# Patient Record
Sex: FEMALE | Race: WHITE | Employment: STUDENT | URBAN - METROPOLITAN AREA
[De-identification: names, ages, dates, MRNs, and addresses within clinical notes are randomized per-mention and may not be internally consistent; named-entity substitution may affect disease eponyms.]

---

## 2017-03-30 ENCOUNTER — TELEPHONE (OUTPATIENT)
Dept: ENDOCRINOLOGY | Age: 27
End: 2017-03-30

## 2017-03-30 DIAGNOSIS — E04.1 THYROID NODULE: ICD-10-CM

## 2017-03-30 DIAGNOSIS — R13.10 DYSPHAGIA, UNSPECIFIED TYPE: Primary | ICD-10-CM

## 2017-03-30 DIAGNOSIS — E03.9 HYPOTHYROIDISM, ADULT: ICD-10-CM

## 2017-03-30 NOTE — TELEPHONE ENCOUNTER
Addendum: 3/30/2017, 3:44 PM  Spoke with Carole Hughes by phone. Patient states that she has noticed some difficulty in swallowing. Wonders if her thyroid may be swollen. Patient states that she has been missing a dose of Levothyroxine \"here and there\", maybe once a week. Advised patient that per Dr. Isidra Art, he will order a thyroid ultrasound,a  swallowing study and labwork. Patient states will have labs drawn tomorrow.      Mj Barrera LPN

## 2017-03-30 NOTE — TELEPHONE ENCOUNTER
Patient is calling in regards to difficulty swollowing. She is scheduled  for 08/2017. Patient contact:  [phone]. Thank you.   Mike Shaikh

## 2017-04-01 LAB
FT4I SERPL CALC-MCNC: 2.7 (ref 1.2–4.9)
T3RU NFR SERPL: 36 % (ref 24–39)
T4 SERPL-MCNC: 7.6 UG/DL (ref 4.5–12)
TSH SERPL DL<=0.005 MIU/L-ACNC: 1.21 UIU/ML (ref 0.45–4.5)

## 2017-04-03 NOTE — PROGRESS NOTES
Her thyroid levels are good. Continue the Levothyroxine 75mcg daily. Once the imaging tests come back we will let her know what they show.

## 2017-04-11 ENCOUNTER — HOSPITAL ENCOUNTER (OUTPATIENT)
Dept: GENERAL RADIOLOGY | Age: 27
Discharge: HOME | End: 2017-04-11
Attending: INTERNAL MEDICINE
Payer: COMMERCIAL

## 2017-04-11 ENCOUNTER — HOSPITAL ENCOUNTER (OUTPATIENT)
Dept: ULTRASOUND IMAGING | Age: 27
Discharge: HOME | End: 2017-04-11
Attending: INTERNAL MEDICINE
Payer: COMMERCIAL

## 2017-04-11 DIAGNOSIS — R13.10 DYSPHAGIA, UNSPECIFIED TYPE: ICD-10-CM

## 2017-04-11 DIAGNOSIS — E04.1 THYROID NODULE: ICD-10-CM

## 2017-04-11 PROCEDURE — 92611 MOTION FLUOROSCOPY/SWALLOW: CPT

## 2017-04-11 PROCEDURE — 74230 X-RAY XM SWLNG FUNCJ C+: CPT

## 2017-04-11 PROCEDURE — 76536 US EXAM OF HEAD AND NECK: CPT

## 2017-04-11 NOTE — PROGRESS NOTES
[address] [place] [address]    Speech Pathology Modified barium swallow Study  Patient: Jun Luna (54 y.o. female)  Date: 4/11/2017  Referring Provider: Dr. Patricia Martínez:   Patient is a 27year old female with past medical history significant for non-malignant thyroid nodule and hypothyroidism that is controlled with medication. Patient complains of food and liquid \"not going down as it should\" when she swallows while head is turned to the left, and a feeling of swelling in her throat. She reports she noticed the issue a couple of months ago and it occurs about once a week. Patient does not notice the issue happening with any specific foods and she has not found anything that helps to alleviate the problem. Patient denies any coughing, choking, trouble swallowing pills, reflux, respiratory issues, or neurological issues. Patient reports she was worried the issue might be caused by her thyroid nodule. Patient was referred by her endocrinologist.     Cristin Spring:   Past Medical History:   Past Medical History:   Diagnosis Date    ADD (attention deficit disorder)     Thyroid nodule     left     Past Surgical History:   Procedure Laterality Date    HX WISDOM TEETH EXTRACTION       Current Dietary Status: regular/thin liquid  Radiologist: Dr. Mannie Sifuentes Views: Lateral;Fluoro  Patient Position: standing    Trial 1:   Consistency Presented: Thin liquid;Pill/Tablet;Pudding; Solid   How Presented: Self-fed/presented;SLP-fed/presented;Cup/sip;Straw;Spoon; Successive swallows       Bolus Acceptance: No impairment   Bolus Formation/Control: No impairment:     Propulsion: No impairment   Oral Residue: None   Initiation of Swallow: No impairment   Timing: No impairment   Penetration: None   Aspiration/Timing: No evidence of aspiration   Pharyngeal Clearance: No residue                       Decreased Tongue Base Retraction?: No  Laryngeal Elevation: WFL (within functional limits)  Aspiration/Penetration Score: 1 (No penetration or aspiration-Contrast does not enter the airway)  Pharyngeal Symmetry: Not assessed  Pharyngeal-Esophageal Segment: No impairment  Pharyngeal Dysfunction: None    Oral Phase Severity: No impairment  Pharyngeal Phase Severity: N/A    ASSESSMENT :  Based on the objective data described above, the patient presents with intact oropharyngeal swallow. No penetration or aspiration noted on all trials. No oral or pharyngeal residue present. Barium tablet passed through pharynx with no difficulty. PLAN/RECOMMENDATIONS :  --regular/thin liquid diet  --do not swallow while head is turned to left     COMMUNICATION/EDUCATION:   The above findings and recommendations were discussed with: patient who verbalized understanding.     Thank you for this referral.  Matt Fournier  Time Calculation: 20 mins

## 2017-04-21 NOTE — PROGRESS NOTES
I have been trying to get in touch with Ms. Aleks Javier with no success. Her swallow study was normal, there was no evidence of an obstructions, masses or abnormalities in the swallowing mechanism. The Thyroid US was also unchanged with no new or concerning findings or features.

## 2017-05-22 RX ORDER — LEVOTHYROXINE SODIUM 75 UG/1
75 TABLET ORAL
Qty: 30 TAB | Refills: 5 | Status: SHIPPED | OUTPATIENT
Start: 2017-05-22 | End: 2017-09-05 | Stop reason: SDUPTHER

## 2017-05-22 NOTE — TELEPHONE ENCOUNTER
Notes Recorded by Latonya Johnson MD on 4/3/2017 at 8:11 AM  Her thyroid levels are good. Continue the Levothyroxine 75mcg daily. Once the imaging tests come back we will let her know what they show.

## 2017-09-05 ENCOUNTER — OFFICE VISIT (OUTPATIENT)
Dept: ENDOCRINOLOGY | Age: 27
End: 2017-09-05

## 2017-09-05 VITALS
BODY MASS INDEX: 20.96 KG/M2 | HEART RATE: 70 BPM | SYSTOLIC BLOOD PRESSURE: 117 MMHG | HEIGHT: 70 IN | DIASTOLIC BLOOD PRESSURE: 81 MMHG | WEIGHT: 146.4 LBS

## 2017-09-05 DIAGNOSIS — E03.9 HYPOTHYROIDISM, ADULT: Primary | ICD-10-CM

## 2017-09-05 DIAGNOSIS — E04.1 THYROID NODULE: ICD-10-CM

## 2017-09-05 RX ORDER — LEVOTHYROXINE SODIUM 75 UG/1
75 TABLET ORAL
Qty: 90 TAB | Refills: 1 | Status: SHIPPED | OUTPATIENT
Start: 2017-09-05 | End: 2017-09-06 | Stop reason: SDUPTHER

## 2017-09-05 NOTE — PATIENT INSTRUCTIONS
I'll call you with the results of your thyroid labs. If you become pregnant, call me an let me know, because we will need to adjust your thyroid hormone dose.

## 2017-09-05 NOTE — MR AVS SNAPSHOT
Visit Information Date & Time Provider Department Dept. Phone Encounter #  
 9/5/2017 11:30 AM Maritza Carias, [address] [facility] [phone] Follow-up Instructions Return in about 1 year (around 9/5/2018). Upcoming Health Maintenance Date Due  
 HPV AGE 9Y-34Y (1 of 3 - Female 3 Dose Series) 12/16/2003 DTaP/Tdap/Td series (1 - Tdap) 12/16/2013 PAP AKA CERVICAL CYTOLOGY 12/16/2013 INFLUENZA AGE 9 TO ADULT 8/1/2017 Allergies as of 9/5/2017  Review Complete On: 9/5/2017 By: Neville Schilling LPN No Known Allergies Current Immunizations  Never Reviewed No immunizations on file. Not reviewed this visit You Were Diagnosed With   
  
 Codes Comments Hypothyroidism, adult    -  Primary ICD-10-CM: E03.9 ICD-9-CM: 551. 9 Thyroid nodule     ICD-10-CM: E04.1 ICD-9-CM: 241.0 Vitals BP Pulse Height(growth percentile) Weight(growth percentile) BMI Smoking Status 117/81 70 5' 9.5\" (1.765 m) 146 lb 6.4 oz (66.4 kg) 21.31 kg/m2 Never Smoker Vitals History BMI and BSA Data Body Mass Index Body Surface Area  
 21.31 kg/m 2 1.8 m 2 Preferred Pharmacy Pharmacy Name Phone [id number] [place] [address] [phone] Your Updated Medication List  
  
   
This list is accurate as of: 9/5/17 11:56 AM.  Always use your most recent med list.  
  
  
  
  
 AMPHETAMINE SALT COMBO 15 mg tablet Generic drug:  dextroamphetamine-amphetamine Take 15 mg by mouth two (2) times a day. levothyroxine 75 mcg tablet Commonly known as:  SYNTHROID Take 1 Tab by mouth Daily (before breakfast). Indications: hypothyroidism We Performed the Following THYROID PANEL W/TSH [02069 CPT(R)] Follow-up Instructions Return in about 1 year (around 9/5/2018). Patient Instructions I'll call you with the results of your thyroid labs. If you become pregnant, call me an let me know, because we will need to adjust your thyroid hormone dose. Introducing [facility] SERVICES! Dora Lind introduces Pegg'd patient portal. Now you can access parts of your medical record, email your doctor's office, and request medication refills online. 1. In your internet browser, go to [email or web address] 2. Click on the First Time User? Click Here link in the Sign In box. You will see the New Member Sign Up page. 3. Enter your Pegg'd Access Code exactly as it appears below. You will not need to use this code after youve completed the sign-up process. If you do not sign up before the expiration date, you must request a new code. · Pegg'd Access Code: [email or web address] Expires: 12/4/2017 11:56 AM 
 
4. Enter the last four digits of your Social Security Number (xxxx) and Date of Birth (mm/dd/yyyy) as indicated and click Submit. You will be taken to the next sign-up page. 5. Create a Pegg'd ID. This will be your Pegg'd login ID and cannot be changed, so think of one that is secure and easy to remember. 6. Create a Pegg'd password. You can change your password at any time. 7. Enter your Password Reset Question and Answer. This can be used at a later time if you forget your password. 8. Enter your e-mail address. You will receive e-mail notification when new information is available in [address] 9. Click Sign Up. You can now view and download portions of your medical record. 10. Click the Download Summary menu link to download a portable copy of your medical information. If you have questions, please visit the Frequently Asked Questions section of the Pegg'd website. Remember, Pegg'd is NOT to be used for urgent needs. For medical emergencies, dial 911. Now available from your iPhone and Android! Please provide this summary of care documentation to your next provider. Your primary care clinician is listed as Vikram Ha. If you have any questions after today's visit, please call [phone].

## 2017-09-05 NOTE — PROGRESS NOTES
Chief Complaint   Patient presents with    Thyroid Problem     pcp and pharmacy verified     History of Present Illness: Amadeo Araujo is a 27 y.o. female here for follow up of hypothyroidism and thyroid nodule. She notes she was noted to have a \"bump on my thyroid\" by Dr. Sue Miller at [facility] and this lead to an [address] which confirmed a thyroid nodule. This was in 2014 and her pathology was benign. She had repeat thyroid US in July 2015 and the nodule was unchanged. Pt notes she had a hx of hyperthyroidism when she was in 7th grade. At our initial visit in July 2015 she was hypothyroid on 50mcg daily but was euthyroid when we increased her dose to 75mcg daily. Pt is still taking her LT4 75mcg daily. She notes that she will occasionally miss a dose, but nothing consistently. She denies issues of CP, SOB, palpitations, tremors, heat or cold intolerance, constipation or diarrhea. Her LMP was 8/15/17, she notes it is regular in timing and duration. She denies issues of dysphagia, dysphonia or chocking sensations. Current Outpatient Prescriptions   Medication Sig    levothyroxine (SYNTHROID) 75 mcg tablet Take 1 Tab by mouth Daily (before breakfast). Indications: hypothyroidism    AMPHETAMINE SALT COMBO 15 mg tablet Take 15 mg by mouth two (2) times a day. No current facility-administered medications for this visit. No Known Allergies  Review of Systems:  - Cardiovascular: no chest pain  - Neurological: no tremors  - Integumentary: skin is normal    Physical Examination:  Blood pressure 117/81, pulse 70, height 5' 9.5\" (1.765 m), weight 146 lb 6.4 oz (66.4 kg).   - General: pleasant, no distress, good eye contact   - Neck: no thyromegaly or thyroid bruits  - Cardiovascular: regular, normal rate, nl s1 and s2, no m/r/g   - Integumentary: skin is normal, no edema  - Neurological: reflexes 2+ at biceps, no tremors  - Psychiatric: normal mood and affect    Data Reviewed:   - none new for review    Assessment/Plan:   1)Hypothyroidism > Pt clinically euthyroid on LT4 75mcg daily. Willl check a TSH and FT4 today and adjust her LT4 dose as needed or refill her current dose. Pt instructed that if she finds out she is pregnant to increase her LT4 by two pills weekly and call me. 2) Thyroid nodule > Her Thyroid US was unchanged between July 2014 (she had a benign FNA at that time) and July 2017. No need to repeat the US further, unless she has any changes in her clinical picture. RTC 1 year     Pt voices understanding and agreement with the plan. Follow-up Disposition:  Return in about 1 year (around 9/5/2018).     Copy sent to:  Dr. Kae Brown

## 2017-09-05 NOTE — TELEPHONE ENCOUNTER
Notes Recorded by Etienne Grewal MD on 4/3/2017 at 8:11 AM  Her thyroid levels are good. Continue the Levothyroxine 75mcg daily. Once the imaging tests come back we will let her know what they show.            She did have the imaging studies. Was she to continue this dose? ([facility] day supply).

## 2017-09-06 ENCOUNTER — TELEPHONE (OUTPATIENT)
Dept: ENDOCRINOLOGY | Age: 27
End: 2017-09-06

## 2017-09-06 LAB
FT4I SERPL CALC-MCNC: 2 (ref 1.2–4.9)
T3RU NFR SERPL: 32 % (ref 24–39)
T4 SERPL-MCNC: 6.3 UG/DL (ref 4.5–12)
TSH SERPL DL<=0.005 MIU/L-ACNC: 0.76 UIU/ML (ref 0.45–4.5)

## 2017-09-06 RX ORDER — LEVOTHYROXINE SODIUM 75 UG/1
75 TABLET ORAL
Qty: 90 TAB | Refills: 3 | Status: SHIPPED | OUTPATIENT
Start: 2017-09-06 | End: 2018-10-18 | Stop reason: SDUPTHER

## 2017-09-06 NOTE — TELEPHONE ENCOUNTER
Results for orders placed or performed in visit on 09/05/17   THYROID PANEL W/TSH   Result Value Ref Range    TSH 0.764 0.450 - 4.500 uIU/mL    T4, Total 6.3 4.5 - 12.0 ug/dL    T3 Uptake 32 24 - 39 %    Free Thyroxine Index 2.0 1.2 - 4.9       Her thyroid levels are looking very good, will continue the Levothyroxine 75mcg daily.

## 2018-10-18 RX ORDER — LEVOTHYROXINE SODIUM 75 UG/1
TABLET ORAL
Qty: 90 TAB | Refills: 3 | Status: SHIPPED | OUTPATIENT
Start: 2018-10-18